# Patient Record
Sex: FEMALE | URBAN - METROPOLITAN AREA
[De-identification: names, ages, dates, MRNs, and addresses within clinical notes are randomized per-mention and may not be internally consistent; named-entity substitution may affect disease eponyms.]

---

## 2019-11-14 ENCOUNTER — HISTORICAL (OUTPATIENT)
Dept: ADMINISTRATIVE | Facility: HOSPITAL | Age: 34
End: 2019-11-14

## 2020-03-03 ENCOUNTER — HISTORICAL (OUTPATIENT)
Dept: LAB | Facility: HOSPITAL | Age: 35
End: 2020-03-03

## 2020-03-04 LAB — GRAM STN SPEC: NORMAL

## 2020-03-06 LAB — FINAL CULTURE: NORMAL

## 2022-04-11 ENCOUNTER — HISTORICAL (OUTPATIENT)
Dept: ADMINISTRATIVE | Facility: HOSPITAL | Age: 37
End: 2022-04-11

## 2022-04-27 VITALS
HEIGHT: 63 IN | WEIGHT: 136.69 LBS | OXYGEN SATURATION: 98 % | BODY MASS INDEX: 24.22 KG/M2 | SYSTOLIC BLOOD PRESSURE: 127 MMHG | DIASTOLIC BLOOD PRESSURE: 85 MMHG

## 2022-05-04 NOTE — HISTORICAL [facility]ERNER
This is a historical note converted from [employer]. Formatting and pictures may have been removed.  Please reference [employer] for original formatting and attached multimedia. Chief Complaint  congestion and cough for a month and a half  History of Present Illness  10/9/19 : 84-year-old female presents with concern of about 3 days?sinusitis?mild otitis with pressure and chest congestion.?No fever, no significant pharyngitis, no known exposures.? No coughing noted while in clinic.? [place] and worried about secondary infection while away. [1]  11/14/19 : Today patient returns to clinic with continuing nasal congestion, sinus congestion, coughing, thick yellow-green mucus?on and off for 4 weeks.? Gradual in onset and worsening. ?No fever, no sore throat.? Denies history of asthma in the past. ?Does not smoke tobacco.  Review of Systems  Constitutional : _No fatigue, No Fever  HEENT : _No sore throat,?+ sinus congestion  Neck : Negative except HPI  Respiratory : _Coughing, mucus production  Cardiovascular : _No chest pain, no palpitations  Gastrointestinal : _No nausea,?vomiting or diarrhea.? No abdominal pain  Integumentary : _No skin rash or abnormal lesion  Neurologic_no headache, no dizziness  Physical Exam  Vitals & Measurements  T:?36.5? ?C (Oral)? HR:?82(Peripheral)? RR:?17? BP:?130/85? SpO2:?99%?  HT:?159?cm? WT:?62?kg? BMI:?24.52? LMP:?10/28/2019 00:00 CDT?  General : Alert and Oriented, No apparent distress, afebrile  Neck - supple  HENT : Oropharynx?very mild redness and swelling.?TMs intact mild fluid no redness.??  Respiratory :?Bilateral equal breath sounds, nonlabored respirations  Cardiovascular : Rate, rhythm regular, normal volume pulse, no murmur  Gastrointestinal:?Full abdomen, soft, nontender to palpate  Integumentary : Warm, Dry and no rash  Assessment/Plan  1.?Chronic rhinosinusitis?J32.9  ?Cold mist vaporizer to keep the airways moist and no draining sinuses.? Continue  antihistamine over-the-counter for congestion and Flonase  Medications?as directed. ?Call or return to clinic for any questions  Ordered:  cefdinir, 300 mg = 1 cap(s), Oral, q12hr, X 7 day(s), # 14 cap(s), 0 Refill(s), Pharmacy: [facility]/pharmacy #[id number]  predniSONE, 20 mg = 1 tab(s), Oral, BID, with food, X 3 day(s), # 6 tab(s), 0 Refill(s), Pharmacy: [facility]/pharmacy #[id number]  Office/Outpatient Visit Level 3 Established 70268 , Chronic rhinosinusitis  Acute bronchitis, [facility], 11/14/19 16:00:00 CST  ?  2.?Acute bronchitis?J20.9  Cool?mist vaporizer and cough drops to keep the airways moist.? Mucinex DM for cough and cold. ?Medications as directed. ?Call this clinic for any questions  Ordered:  albuterol, 2 puff(s), INH, q6hr, PRN PRN for coughing wheezing, # 1 EA, 0 Refill(s), Pharmacy: [facility]/pharmacy #[id number]  cefdinir, 300 mg = 1 cap(s), Oral, q12hr, X 7 day(s), # 14 cap(s), 0 Refill(s), Pharmacy: [facility]/pharmacy #[id number]  predniSONE, 20 mg = 1 tab(s), Oral, BID, with food, X 3 day(s), # 6 tab(s), 0 Refill(s), Pharmacy: [facility]/pharmacy #[id number]  Office/Outpatient Visit Level 3 Established 99390 PC, Chronic rhinosinusitis  Acute bronchitis, [facility], 11/14/19 16:00:00 CST  ?  3.?Cough?R05  ?Reviewed the chest x-ray, no acute finding  ?   Problem List/Past Medical History  Ongoing  No chronic problems  Historical  No qualifying data  Procedure/Surgical History  Lumpectomy (2015)  Lumpectomy (2014)  FESS (2007)  Adenoidectomy (1987)  Myringotomy (1987)   Medications  Omnicef 300 mg oral capsule, 300 mg= 1 cap(s), Oral, q12hr  prednisONE 20 mg oral tablet, 20 mg= 1 tab(s), Oral, BID  ProAir HFA 90 mcg/inh inhalation aerosol with adapter, 2 puff(s), INH, q6hr, PRN  Allergies  Latex?(Hives)  codeine?(Itching)  Social History  Abuse/Neglect  No, 11/14/2019  Alcohol  Current, Liquor, 1-2 times per month, 10/09/2019  Substance Use  Never, 10/09/2019  Tobacco  Never (less than 100 in lifetime), N/A, 11/14/2019  Family History  Family history is  negative  Health Maintenance  Health Maintenance  ???Pending?(in the next year)  ??? ??OverDue  ??? ? ? ?Alcohol Misuse Screening due??01/01/19??and every 1??year(s)  ??? ??Due?  ??? ? ? ?ADL Screening due??11/14/19??and every 1??year(s)  ??? ? ? ?Cervical Cancer Screening due??11/14/19??and every?  ??? ? ? ?Depression Screening due??11/14/19??and every?  ??? ? ? ?Influenza Vaccine due??11/14/19??and every?  ??? ? ? ?Tetanus Vaccine due??11/14/19??and every 10??year(s)  ??? ??Due In Future?  ??? ? ? ?Obesity Screening not due until??01/01/20??and every 1??year(s)  ??? ? ? ?Blood Pressure Screening not due until??11/13/20??and every 1??year(s)  ??? ? ? ?Body Mass Index Check not due until??11/13/20??and every 1??year(s)  ???Satisfied?(in the past 1 year)  ??? ??Satisfied?  ??? ? ? ?Blood Pressure Screening on??11/14/19.??Satisfied by Ethan RT, Shawn A.  ??? ? ? ?Body Mass Index Check on??11/14/19.??Satisfied by Ethan RT, Shawn A.  ??? ? ? ?Influenza Vaccine on??11/14/19.??Satisfied by Ethan RT, Shawn A.  ??? ? ? ?Obesity Screening on??11/14/19.??Satisfied by Ethan RT, Shawn A.  ?     [1]?Office Visit Note; Valery Baum MD 10/09/2019 09:31 CDT   none

## 2024-05-07 ENCOUNTER — OFFICE VISIT (OUTPATIENT)
Dept: URGENT CARE | Facility: CLINIC | Age: 39
End: 2024-05-07
Payer: COMMERCIAL

## 2024-05-07 VITALS
TEMPERATURE: 98 F | HEART RATE: 92 BPM | WEIGHT: 115 LBS | BODY MASS INDEX: 19.63 KG/M2 | RESPIRATION RATE: 18 BRPM | DIASTOLIC BLOOD PRESSURE: 83 MMHG | SYSTOLIC BLOOD PRESSURE: 127 MMHG | OXYGEN SATURATION: 99 % | HEIGHT: 64 IN

## 2024-05-07 DIAGNOSIS — J06.9 ACUTE URI: Primary | ICD-10-CM

## 2024-05-07 PROCEDURE — 99202 OFFICE O/P NEW SF 15 MIN: CPT | Mod: 25,,, | Performed by: FAMILY MEDICINE

## 2024-05-07 PROCEDURE — 96372 THER/PROPH/DIAG INJ SC/IM: CPT | Mod: ,,, | Performed by: FAMILY MEDICINE

## 2024-05-07 RX ORDER — ACETAZOLAMIDE 125 MG/1
125 TABLET ORAL 2 TIMES DAILY
COMMUNITY
Start: 2024-02-07 | End: 2024-05-07

## 2024-05-07 RX ORDER — FLUTICASONE PROPIONATE 50 MCG
1 SPRAY, SUSPENSION (ML) NASAL
COMMUNITY

## 2024-05-07 RX ORDER — BETAMETHASONE SODIUM PHOSPHATE AND BETAMETHASONE ACETATE 3; 3 MG/ML; MG/ML
6 INJECTION, SUSPENSION INTRA-ARTICULAR; INTRALESIONAL; INTRAMUSCULAR; SOFT TISSUE
Status: COMPLETED | OUTPATIENT
Start: 2024-05-07 | End: 2024-05-07

## 2024-05-07 RX ORDER — CYCLOBENZAPRINE HCL 10 MG
10 TABLET ORAL NIGHTLY PRN
COMMUNITY
Start: 2024-04-29

## 2024-05-07 RX ADMIN — BETAMETHASONE SODIUM PHOSPHATE AND BETAMETHASONE ACETATE 6 MG: 3; 3 INJECTION, SUSPENSION INTRA-ARTICULAR; INTRALESIONAL; INTRAMUSCULAR; SOFT TISSUE at 02:05

## 2024-05-07 NOTE — PATIENT INSTRUCTIONS
Discussed the physical finding , Condition and course   Concerns of allergies, possible early examination.  Monitor the symptoms closely.  Tylenol and ibuprofen for fever, body aches and sore throat.   Warm saltwater gargles for sore throat.   Claritin 10 mg or Zyrtec 10 mg for nasal congestion    Celestone IM today as anti inflammation for symptom relief, risk and benefits discussed voiced understanding   Encouraged to space out steroid injections, only as needed,  encouraged to monitor closely.  Voiced understanding   Robitussin-DM for cough and cold as needed and as directed   Call this clinic for any questions.  Follow up with primary MD as needed

## 2024-05-07 NOTE — PROGRESS NOTES
"Subjective:      Patient ID: Migdalia Coburn is a 39 y.o. female.    Vitals:  height is 5' 4" (1.626 m) and weight is 52.2 kg (115 lb). Her oral temperature is 97.6 °F (36.4 °C). Her blood pressure is 127/83 and her pulse is 92. Her respiration is 18 and oxygen saturation is 99%.     Chief Complaint: Nasal Congestion     Patient is a 39 y.o. female who presents to urgent care with complaints of nasal congestion,bilateral ear fullness, and sore throat x yesterday. Alleviating factors include Claritin, compound antihistamine, and OTC nasal saline spray with mild amount of relief. Patient denies fever.    ROS :  Constitutional : _  no measured fever, no body aches or headache  Eyes : _No redness, drainage or pain  HENT_  mild sore throat, no difficulty swallowing, bilateral ear fullness present  Respiratory_no wheezing, no shortness of breath  Cardiovascular_no chest pain  Gastrointestinal_ No vomiting, No diarrhea, No abdominal pain  Musculoskeletal_no joint pain, no joint swelling  Integumentary_no skin rash      Nurse's notes and vital signs.  No fever, O2 sats 99%.  No concerns of positive exposure to infections.  Requesting for cortisone injection as it helped in the past, understands the risks and benefits.  States has lot of work, symptomatic and severity 6 on 10  Objective:     Physical Exam  General : Alert and Oriented, No apparent distress, afebrile, sounds stuffy and congested  Neck - supple  HENT : Oropharynx  mild redness, no swelling, bilateral TMs intact mild fluid no redness.  Right TM postsurgical changes  Respiratory : Bilateral equal breath sounds, nonlabored respirations  Cardiovascular : Rate, rhythm regular, normal volume pulse, no murmur  Gastrointestinal: Full abdomen, soft, nontender to palpate  Integumentary : Warm, Dry and no rash    Assessment:     1. Acute URI      Plan:   Discussed the physical finding , Condition and course   Concerns of allergies, possible early examination.  Monitor " the symptoms closely.  Tylenol and ibuprofen for fever, body aches and sore throat.   Warm saltwater gargles for sore throat.   Claritin 10 mg or Zyrtec 10 mg for nasal congestion    Celestone IM today as anti inflammation for symptom relief, risk and benefits discussed voiced understanding  Robitussin-DM for cough and cold as needed and as directed   Call this clinic for any questions.  Follow up with primary MD as needed      Acute URI  -     betamethasone acetate-betamethasone sodium phosphate injection 6 mg     After cortisone injection, patient felt nauseated and vomited once.   Laying in bed right lateral position.  No clamminess.  Talking, clear speech.  No shortness a breath.      Did not look pale.  Ice pack helping her symptoms.  Regular heart rate, lungs clear.  No wheezing.    After 10 minutes  sitting in semi reclined position.  Denies chest tightness, chest pain or shortness a breath.  Comfortable in the exam table, clear speech and appropriate   Communication.  Tolerating Gatorade.  No new vomiting or nausea.    Repeat vital signs per nurse's note.